# Patient Record
Sex: FEMALE | Race: WHITE | Employment: UNEMPLOYED | URBAN - METROPOLITAN AREA
[De-identification: names, ages, dates, MRNs, and addresses within clinical notes are randomized per-mention and may not be internally consistent; named-entity substitution may affect disease eponyms.]

---

## 2024-01-02 ENCOUNTER — APPOINTMENT (OUTPATIENT)
Dept: GENERAL RADIOLOGY | Facility: HOSPITAL | Age: 34
End: 2024-01-02
Attending: EMERGENCY MEDICINE
Payer: MEDICAID

## 2024-01-02 ENCOUNTER — HOSPITAL ENCOUNTER (EMERGENCY)
Facility: HOSPITAL | Age: 34
Discharge: HOME | End: 2024-01-02
Attending: EMERGENCY MEDICINE
Payer: MEDICAID

## 2024-01-02 VITALS
DIASTOLIC BLOOD PRESSURE: 84 MMHG | SYSTOLIC BLOOD PRESSURE: 130 MMHG | RESPIRATION RATE: 17 BRPM | TEMPERATURE: 99 F | OXYGEN SATURATION: 98 % | WEIGHT: 160 LBS | HEART RATE: 78 BPM

## 2024-01-02 DIAGNOSIS — S63.502A SPRAIN OF LEFT WRIST, INITIAL ENCOUNTER: Primary | ICD-10-CM

## 2024-01-02 LAB — B-HCG UR QL: NEGATIVE

## 2024-01-02 PROCEDURE — 99284 EMERGENCY DEPT VISIT MOD MDM: CPT

## 2024-01-02 PROCEDURE — 81025 URINE PREGNANCY TEST: CPT

## 2024-01-02 PROCEDURE — 73110 X-RAY EXAM OF WRIST: CPT | Performed by: EMERGENCY MEDICINE

## 2024-01-02 RX ORDER — IBUPROFEN 600 MG/1
600 TABLET ORAL ONCE
Status: COMPLETED | OUTPATIENT
Start: 2024-01-02 | End: 2024-01-02

## 2024-01-02 NOTE — ED PROVIDER NOTES
Patient Seen in: [facility]         EMERGENCY DEPARTMENT NOTE    Dictated. Voice Transcription software has been utilized for this dictation (the reader should be aware that typographical errors are possible with voice transcription software and to please contact the dictating physician if there are questions.)         History     Chief Complaint   Patient presents with    Wrist Injury       There may be discrepancies from triage note.     HPI    History provided by patient.  33-year-old female, immunocompetent, denies any medical problems complaining of left wrist, nondominant hand pain and swelling status post lifting a heavy object 2 weeks ago.  Reports mild pain worse with range of motion of wrist.  She is still able to use her wrist.  Denies significant pain with range of motion.  No overlying skin color changes.      No fevers, chills, nausea, vomiting, diarrhea, constipation, cough, cold symptoms, urinary complaints.  No chest pain, shortness of breath  No headache, neck pain, neck stiffness, incontinence.  No changes in mentation, no changes in vision, no total extremity weakness, no total extremity paresthesia, no difficulty speaking.  No alleviating or exacerbating factors.  Denies orthopnea, pnd, change in exercise tolerance limited by chest pain/sob , lower extremity edema/asymmetry.   Patient presents with her son primarily for an evaluation of cough/fever.  Decided to check her and for the aforementioned    History reviewed. History reviewed. No pertinent past medical history.    History reviewed.   Past Surgical History:   Procedure Laterality Date    APPENDECTOMY                 Medications :  (Not in a hospital admission)       No family history on file.    Smoking Status:   Social History     Socioeconomic History    Marital status:    Tobacco Use    Smoking status: Never    Smokeless tobacco: Never   Substance and Sexual Activity    Alcohol use: Never    Drug use: Never        Review of Systems   Constitutional: Negative.    HENT: Negative.     Eyes: Negative.    Respiratory: Negative.     Cardiovascular: Negative.    Gastrointestinal: Negative.    Genitourinary: Negative.    Musculoskeletal: Negative.    Skin: Negative.    Neurological: Negative.    Endo/Heme/Allergies: Negative.    Psychiatric/Behavioral: Negative.     All other systems reviewed and are negative.    Pertinent positives as listed.  All other organ systems are reviewed and are negative.    Constitutional and vital signs reviewed.      Social History and Family History elements reviewed from today, pertinent positives to the presenting problem noted.    Physical Exam     ED Triage Vitals [01/02/24 0939]   /89   Pulse 79   Resp 18   Temp 98.8 °F (37.1 °C)   Temp src Temporal   SpO2 99 %   O2 Device None (Room air)       All measures to prevent infection transmission during my interaction with the patient were taken. The patient was already wearing a droplet mask on my arrival to the room. Personal protective equipment including droplet mask, eye protection, and gloves were worn throughout the duration of the exam.  Handwashing was performed prior to and after the exam.  Stethoscope and any equipment used during my examination was cleaned with super sani-cloth germicidal wipes following the exam.     Physical Exam  Vitals and nursing note reviewed.   Constitutional:       General: She is not in acute distress.     Appearance: She is not ill-appearing or toxic-appearing.   Cardiovascular:      Rate and Rhythm: Normal rate and regular rhythm.      Comments: Radial pulses 2+ bilaterally    Pulmonary:      Effort: Pulmonary effort is normal. No respiratory distress.   Abdominal:      General: There is no distension.      Palpations: Abdomen is soft.      Tenderness: There is no abdominal tenderness. There is no guarding or rebound.   Musculoskeletal:      Cervical back: Normal range of motion. No rigidity.      Right  lower leg: No edema.      Left lower leg: No edema.      Comments: Mild swelling and tenderness to left wrist.  Normal range of motion of left wrist with no significant tenderness.  No proximal nor distal swelling, no crepitus    No overlying skin color changes, no induration, no fluctuance  Soft compartments of bilateral upper extremities   Skin:     Capillary Refill: Capillary refill takes less than 2 seconds.      Coloration: Skin is not jaundiced or pale.      Findings: No bruising, erythema, lesion or rash.      Comments: Mild swelling and tenderness to left wrist.  Normal range of motion of left wrist with no significant tenderness.  No proximal nor distal swelling, no crepitus    No overlying skin color changes, no induration, no fluctuance   Neurological:      Mental Status: She is alert.      Comments: Distal motor and sensory function intact to radial, median, ulnar nerves.    5/5 bilateral finger , biceps, triceps, Sensory function intact symmetrically bilaterally upper extremities to soft touch.       Psychiatric:         Mood and Affect: Mood normal.         Behavior: Behavior normal.           Review of prior notes in Care everywhere/Epic performed by myself:  -No recent ER visits  ED Course     If labs obtained, they are personally reviewed by myself:   Labs Reviewed - No data to display    If radiologic studies ordered during today's ER visit, my independent interpretation are seen directly below.  This is awaiting the radiologist's final interpretation.  Wrist x-ray,independent interpretation of radiologic study completed by myself and awaiting formal radiologist interpretation:      Imaging Results read by radiology in ED: XR WRIST COMPLETE (MIN 3 VIEWS), LEFT (CPT=73110)    Result Date: 1/2/2024  CONCLUSION:  Soft tissue swelling of the left wrist without radiographic evidence of underlying osseous injury.    Dictated by (CST): Jorje Connolly MD on 1/02/2024 at 10:47 AM     Finalized by  (CST): Jorje Connolly MD on 1/02/2024 at 10:48 AM               ED Medications Administered:   Medications   ibuprofen (Motrin) tab 600 mg (600 mg Oral Given 1/2/24 1055)           Vitals:    01/02/24 0939   BP: 124/89   Pulse: 79   Resp: 18   Temp: 98.8 °F (37.1 °C)   TempSrc: Temporal   SpO2: 99%   Weight: 72.6 kg     *I personally reviewed and interpreted all ED vitals.    Pulse Ox interpretation by myself: 99%, Room air, Normal         Medical Record Review: I personally reviewed available prior medical records for any recent pertinent discharge summaries, testing, and procedures and reviewed those reports.      [facility]     Medical decision making/ED Course:   33-year-old female who presents to the emergency department for left wrist pain and swelling status post lifting a heavy object several weeks ago.  Mild tenderness elicited however neurologically and vascularly intact with equal distal pulses.  Symptoms seem most consistent with sprain.  X-ray negative for fracture.  Patient placed in Velcro splint and supportive care instructions provided with NSAIDs  Denies history of aneurysms/intracranial bleed/GI bleed.    She is comfortable with discharge.  I encouraged her to follow with primary care provider in the next several days for reevaluation.  If symptoms persist she may benefit from seeing an outpatient rheumatologist.  Denies history of rheumatological disease.  Reports immunocompetence.    Septic joint, DVT, cellulitis, abscess, vascular insufficiency, compartment syndrome, among other life-threatening medical conditions considered and seems unlikely given patient's history, exam, and appearance.  Strict return instructions given.  Patient encouraged to follow-up with primary care provider in the next few days.  Advised to return to the emergency department for any worsening symptoms    Patient is non toxic appearing, is in no distress, hemodynamically stable.  Pt agrees and is aware of plan.   Smiling upon  dc         Differential Diagnosis:  as listed above in medical decision making.   *Please note that in the presenting to the emergency department, illness/injury that poses a threat to life or function is considered during this patient's initial evaluation.    The complexity of this visit is therefore inherently more complex given the need to consider life threatening pathology prior to any other etiology for this patient's visit.    The differential diagnosis and medical decision above exemplify this rationale.       Medical Decision Making  Problems Addressed:  Sprain of left wrist, initial encounter: acute illness or injury    Amount and/or Complexity of Data Reviewed  External Data Reviewed: notes.    Risk  OTC drugs.               Vitals:    01/02/24 0939   BP: 124/89   Pulse: 79   Resp: 18   Temp: 98.8 °F (37.1 °C)   TempSrc: Temporal   SpO2: 99%   Weight: 72.6 kg             Complicating Factors: Significant medical problems that contribute to the complexity of this emergency room evaluation is listed above.    Condition upon leaving the department: Stable    Disposition and Plan     Clinical Impression:  1. Sprain of left wrist, initial encounter        Disposition:  Discharge    Medications Prescribed:  There are no discharge medications for this patient.      I have discussed the discharge plan with the patient and/or family or well wisher present in the room with the patient's permission.  They state that they understand and agree with the plan.  All questions regarding their care have been answered prior to discharge.  They are aware: Emergency Department is not intended to be a substitute for an effort to provide complete medical care. The imaging, if any, have often been interpreted on a preliminary basis pending final reading by the radiologist.  Instructed to return immediately to the ED if any changes or worsening of condition should occur.  If patient's blood pressure was greater than 140/90 today,  patient encouraged to have this blood pressure rechecked with primary MD and blood pressure education provided.

## 2024-01-02 NOTE — ED INITIAL ASSESSMENT (HPI)
Patient presents to the ED c/o  left wrist pain x 3 weeks. Patient does not recall an injury. +swelling to left hand, CMS intact

## 2024-01-02 NOTE — ED QUICK NOTES
MD cleared patient for discharge. Patient provided with discharge paperwork and RN discussed plan of care. Patient given opportunity to ask any questions. Patient was in no apparent distress. Patient instructed to follow up with PCP. Patient ambulated out of ED with steady gait.

## 2024-01-02 NOTE — DISCHARGE INSTRUCTIONS
Please follow-up with your primary care provider in the next several days for reevaluation of your pain.    At this time there is no fracture noted on x-ray.  If your pain persists, you may require repeat x-ray or MRI to evaluate for a fracture or ligamentous injury. Fractures are not always immediately seen on x-ray and ligamentous injuries are not seen on x-ray.    Please elevate your extremity above your heart to reduce swelling and take over-the-counter pain medication as needed for pain.  You may take 600 mg of ibuprofen with food every 6 hours as needed for pain with food for the next 3-5 days if   1-you do not have any kidney problems  2-you do not take anticoagulation medications/antiplatelet medication  3-you do not have a history of aneurysms, intracranial bleed, gastrointestinal bleed.  The Emergency Department is not intended to be a substitute for an effort to provide complete medical care. The imaging, if any, have often been interpreted on a preliminary basis pending final reading by the radiologist. If your blood pressure was greater than 140/90, please have this blood pressure rechecked by your primary care provider in the next several days. in the next several days.